# Patient Record
Sex: FEMALE | Race: BLACK OR AFRICAN AMERICAN | NOT HISPANIC OR LATINO | URBAN - METROPOLITAN AREA
[De-identification: names, ages, dates, MRNs, and addresses within clinical notes are randomized per-mention and may not be internally consistent; named-entity substitution may affect disease eponyms.]

---

## 2021-02-11 ENCOUNTER — OFFICE VISIT (OUTPATIENT)
Dept: URBAN - METROPOLITAN AREA CLINIC 35 | Facility: CLINIC | Age: 41
End: 2021-02-11

## 2021-02-11 ENCOUNTER — TELEPHONE ENCOUNTER (OUTPATIENT)
Dept: URBAN - METROPOLITAN AREA CLINIC 35 | Facility: CLINIC | Age: 41
End: 2021-02-11

## 2021-02-11 VITALS
OXYGEN SATURATION: 98 % | WEIGHT: 222 LBS | HEART RATE: 97 BPM | SYSTOLIC BLOOD PRESSURE: 148 MMHG | BODY MASS INDEX: 40.85 KG/M2 | HEIGHT: 62 IN | DIASTOLIC BLOOD PRESSURE: 98 MMHG

## 2021-02-11 RX ORDER — HYDROCHLOROTHIAZIDE 12.5 MG/1
1 TABLET IN THE MORNING TABLET ORAL ONCE A DAY
Qty: 30 | Status: ACTIVE | COMMUNITY

## 2021-02-11 RX ORDER — LISINOPRIL 20 MG/1
1 TABLET TABLET ORAL ONCE A DAY
Qty: 30 | Status: ACTIVE | COMMUNITY

## 2021-02-11 RX ORDER — DERMATOPHAGOIDES PTERONYSSINUS AND DERMATOPHAGOIDES FARINAE 6; 6 [ARB'U]/1; [ARB'U]/1
AS DIRECTED TABLET SUBLINGUAL
Status: ACTIVE | COMMUNITY

## 2021-02-11 RX ORDER — LISINOPRIL AND HYDROCHLOROTHIAZIDE 20; 12.5 MG/1; MG/1
1 TABLET TABLET ORAL ONCE A DAY
Qty: 30 | Status: ON HOLD | COMMUNITY

## 2021-02-11 RX ORDER — SODIUM, POTASSIUM,MAG SULFATES 17.5-3.13G
ML AS DIRECTED SOLUTION, RECONSTITUTED, ORAL ORAL
Qty: 1 KIT | Refills: 0 | OUTPATIENT
Start: 2021-02-11

## 2021-02-11 RX ORDER — MONTELUKAST SODIUM 10 MG/1
1 TABLET TABLET, FILM COATED ORAL ONCE A DAY
Qty: 30 | Status: ACTIVE | COMMUNITY

## 2021-02-11 NOTE — HPI-MIGRATED HPI
;   ;     Bloating/Gas : Patient admits recent onset of bloating and gas. Patient admits associated symptoms such as changes in bowel habits. Patient denies  abdominal pain, indigestion, excessive belching,  Patient currently admits 3-5 formed movements per day. Patient denies blood, mucus, or melena in stools.    Patient is having bloating throughout the day, postprandial no matter of the content .  Patient states admits urgency with bowel movements.  Patient states that they have tried Gas-X with some relief of symptoms.  Patient denies having a colonoscopy or EGD in past. Patient denies family hx of colonic disease/cancer/polyps or esophageal disease/cancer.   ;   Change in bowel habits : 39 y/o female patient admits recent onset of change in bowel habits. The onset was 3 months ago.   Patient currently admits 3-5 formed movements per day. Patient denies blood, mucus, or melena in stools.  Patient admits that the symptoms appear postprandial, usually within 10-15 minutes after a meal.   Patient admits associated abdominal discomfort, bloating, and gas. Patient admits he has an incomplete defecation, feels uncontrollably urgent.   Patient denies recently drinking lake or well water, any changes in their medications, or taking any antibiotics in the last 6 months.  Patient denies having a colonoscopy in past. Patient admits family hx of colonic disease/cancer/polyps. Patient notes that her father had a history of diverticulitis. ;

## 2021-02-23 ENCOUNTER — OFFICE VISIT (OUTPATIENT)
Dept: URBAN - METROPOLITAN AREA SURGERY CENTER 8 | Facility: SURGERY CENTER | Age: 41
End: 2021-02-23

## 2021-03-17 ENCOUNTER — OFFICE VISIT (OUTPATIENT)
Dept: URBAN - METROPOLITAN AREA CLINIC 31 | Facility: CLINIC | Age: 41
End: 2021-03-17

## 2021-04-06 ENCOUNTER — TELEPHONE ENCOUNTER (OUTPATIENT)
Dept: URBAN - METROPOLITAN AREA CLINIC 35 | Facility: CLINIC | Age: 41
End: 2021-04-06

## 2021-04-06 RX ORDER — SODIUM, POTASSIUM,MAG SULFATES 17.5-3.13G
ML AS DIRECTED SOLUTION, RECONSTITUTED, ORAL ORAL
Qty: 1 KIT | Refills: 0 | OUTPATIENT
Start: 2021-02-11

## 2021-04-13 ENCOUNTER — OFFICE VISIT (OUTPATIENT)
Dept: URBAN - METROPOLITAN AREA SURGERY CENTER 8 | Facility: SURGERY CENTER | Age: 41
End: 2021-04-13

## 2021-04-28 ENCOUNTER — TELEPHONE ENCOUNTER (OUTPATIENT)
Dept: URBAN - METROPOLITAN AREA CLINIC 35 | Facility: CLINIC | Age: 41
End: 2021-04-28

## 2021-04-28 ENCOUNTER — DASHBOARD ENCOUNTERS (OUTPATIENT)
Age: 41
End: 2021-04-28

## 2021-04-28 ENCOUNTER — OFFICE VISIT (OUTPATIENT)
Dept: URBAN - METROPOLITAN AREA CLINIC 31 | Facility: CLINIC | Age: 41
End: 2021-04-28

## 2021-04-28 VITALS
SYSTOLIC BLOOD PRESSURE: 132 MMHG | BODY MASS INDEX: 41.77 KG/M2 | WEIGHT: 227 LBS | HEART RATE: 77 BPM | OXYGEN SATURATION: 97 % | HEIGHT: 62 IN | DIASTOLIC BLOOD PRESSURE: 80 MMHG

## 2021-04-28 RX ORDER — MONTELUKAST SODIUM 10 MG/1
1 TABLET TABLET, FILM COATED ORAL ONCE A DAY
Qty: 30 | Status: ACTIVE | COMMUNITY

## 2021-04-28 RX ORDER — HYDROCHLOROTHIAZIDE 12.5 MG/1
1 TABLET IN THE MORNING TABLET ORAL ONCE A DAY
Qty: 30 | Status: ON HOLD | COMMUNITY

## 2021-04-28 RX ORDER — DERMATOPHAGOIDES PTERONYSSINUS AND DERMATOPHAGOIDES FARINAE 6; 6 [ARB'U]/1; [ARB'U]/1
AS DIRECTED TABLET SUBLINGUAL
Status: ACTIVE | COMMUNITY

## 2021-04-28 RX ORDER — MESALAMINE 1.2 G/1
2 TABLETS TABLET, DELAYED RELEASE ORAL ONCE A DAY
Qty: 60 | Refills: 6 | OUTPATIENT
Start: 2021-04-28

## 2021-04-28 RX ORDER — LISINOPRIL 20 MG/1
1 TABLET TABLET ORAL ONCE A DAY
Qty: 30 | Status: ON HOLD | COMMUNITY

## 2021-04-28 RX ORDER — SODIUM, POTASSIUM,MAG SULFATES 17.5-3.13G
ML AS DIRECTED SOLUTION, RECONSTITUTED, ORAL ORAL
Qty: 1 KIT | Refills: 0 | Status: ON HOLD | COMMUNITY
Start: 2021-02-11

## 2021-04-28 RX ORDER — LISINOPRIL AND HYDROCHLOROTHIAZIDE 20; 12.5 MG/1; MG/1
1 TABLET TABLET ORAL ONCE A DAY
Qty: 30 | Status: ON HOLD | COMMUNITY

## 2021-04-28 NOTE — HPI-MIGRATED HPI
;   ;   ;     Bloating/Gas : Patient admits continuation of gas and bloating. Patient denies any abdominal pain or hearburn. Patient currently admits 3-5 formed movements per day. Patient denies blood, mucus, or melena in stools, usually postprandial.     Last visit (02/11/2021) Patient admits recent onset of bloating and gas. Patient admits associated symptoms such as changes in bowel habits. Patient denies  abdominal pain, indigestion, excessive belching,  Patient currently admits 3-5 formed movements per day. Patient denies blood, mucus, or melena in stools.    Patient is having bloating throughout the day, postprandial no matter of the content .  Patient states admits urgency with bowel movements.  Patient states that they have tried Gas-X with some relief of symptoms.  Patient denies having a colonoscopy or EGD in past. Patient denies family hx of colonic disease/cancer/polyps or esophageal disease/cancer.;   Change in bowel habits :  Patient admits that she currently admits 3-5 formed movements per day. Patient denies blood, mucus, or melena in stools.  Patient admits that the symptoms appear postprandial, usually within 10-15 minutes after a meal. Patient does admit continuation of bloating and gas.      Last visit (02/11/2021) 39 y/o female patient admits recent onset of change in bowel habits. The onset was 3 months ago.   Patient currently admits 3-5 formed movements per day. Patient denies blood, mucus, or melena in stools.  Patient admits that the symptoms appear postprandial, usually within 10-15 minutes after a meal.   Patient admits associated abdominal discomfort, bloating, and gas. Patient admits he has an incomplete defecation, feels uncontrollably urgent.   Patient denies recently drinking lake or well water, any changes in their medications, or taking any antibiotics in the last 6 months.  Patient denies having a colonoscopy in past. Patient admits family hx of colonic disease/cancer/polyps. Patient notes that her father had a history of diverticulitis.;   Colonoscopy Follow-Up : Patient presents today for follow up to her colonoscopy which was completed on  (04/13/2021) by Dr. Allen Jimenez.  Patient denies any complications after her procedure.  Patient currently admits 3-5 formed movements per day. Patient denies blood, mucus, or melena in stools.  Patient admits that the symptoms appear postprandial, usually within 10-15 minutes after a meal.  Patient denies any associated abdominal pain, bloating, or gas.   Colonoscopy report shows:  One 10 mm polyp in the transverse colon, removed with a hot snare. Polyp resection was incomplete, and the resected tissue was partially retrieved. ;

## 2021-06-01 ENCOUNTER — TELEPHONE ENCOUNTER (OUTPATIENT)
Dept: URBAN - METROPOLITAN AREA CLINIC 35 | Facility: CLINIC | Age: 41
End: 2021-06-01

## 2021-06-16 ENCOUNTER — OFFICE VISIT (OUTPATIENT)
Dept: URBAN - METROPOLITAN AREA CLINIC 31 | Facility: CLINIC | Age: 41
End: 2021-06-16

## 2021-06-16 NOTE — HPI-MIGRATED HPI
;   ;   ;     Bloating/Gas :      Last visit (04/28/2021) Patient admits continuation of gas and bloating. Patient denies any abdominal pain or hearburn. Patient currently admits 3-5 formed movements per day. Patient denies blood, mucus, or melena in stools, usually postprandial.     Last visit (02/11/2021) Patient admits recent onset of bloating and gas. Patient admits associated symptoms such as changes in bowel habits. Patient denies  abdominal pain, indigestion, excessive belching,  Patient currently admits 3-5 formed movements per day. Patient denies blood, mucus, or melena in stools.    Patient is having bloating throughout the day, postprandial no matter of the content .  Patient states admits urgency with bowel movements.  Patient states that they have tried Gas-X with some relief of symptoms.  Patient denies having a colonoscopy or EGD in past. Patient denies family hx of colonic disease/cancer/polyps or esophageal disease/cancer.;   Change in bowel habits :      Last visit (04/28/2021) Patient admits that she currently admits 3-5 formed movements per day. Patient denies blood, mucus, or melena in stools.  Patient admits that the symptoms appear postprandial, usually within 10-15 minutes after a meal. Patient does admit continuation of bloating and gas.      Last visit (02/11/2021) 41 y/o female patient admits recent onset of change in bowel habits. The onset was 3 months ago.   Patient currently admits 3-5 formed movements per day. Patient denies blood, mucus, or melena in stools.  Patient admits that the symptoms appear postprandial, usually within 10-15 minutes after a meal.   Patient admits associated abdominal discomfort, bloating, and gas. Patient admits he has an incomplete defecation, feels uncontrollably urgent.   Patient denies recently drinking lake or well water, any changes in their medications, or taking any antibiotics in the last 6 months.  Patient denies having a colonoscopy in past. Patient admits family hx of colonic disease/cancer/polyps. Patient notes that her father had a history of diverticulitis.;   Colonoscopy Follow-Up :    Mesalamine 1.2 GM  Follow Up  6 Months,Colon Recall 1 year        Last visit (04/28/2021) Patient presents today for follow up to her colonoscopy which was completed on  (04/13/2021) by Dr. Allen Jimenez.  Patient denies any complications after her procedure.  Patient currently admits 3-5 formed movements per day. Patient denies blood, mucus, or melena in stools.  Patient admits that the symptoms appear postprandial, usually within 10-15 minutes after a meal.  Patient denies any associated abdominal pain, bloating, or gas.   Colonoscopy report shows:  One 10 mm polyp in the transverse colon, removed with a hot snare. Polyp resection was incomplete, and the resected tissue was partially retrieved.;